# Patient Record
(demographics unavailable — no encounter records)

---

## 2025-02-07 NOTE — CONSULT LETTER
[FreeTextEntry1] : Dear Dr. Michael Lenz,  I had the pleasure of seeing your patient JANNIE PAEZ in the office today.  My office note is attached. PLEASE READ THE "ASSESSMENT" SECTION OF THE NOTE TO SEE MY IMPRESSION AND PLAN.  Thank you very much for allowing me to participate in the care of your patient.  Sincerely,  Moises Craig M.D., FAC, FACP Director, Celiac Program at [facility]  of Medicine, [facility] [employer] at \[facility] [place] [facility]\""/[facility] Adjunct  of Medicine, [facility] Practice Director, [facility] - Gastroenterology at [address] - [address] [place] [address] Tel: [phone] Email: [email or web address]   The attached note has been created using a voice recognition system (Dragon).  There may be some misspellings and typos.  Please call my office if you have any issues or questions.

## 2025-02-07 NOTE — REASON FOR VISIT
[Language Line ] : provided by Language Line   [FreeTextEntry1] : Gastric polyps, reflux, hemorrhoids, rectal bleeding [Interpreters_IDNumber] : [id number] [Interpreters_FullName] : Roberto Carlos Yoo [TWNoteComboBox1] : Citizen of [place]

## 2025-02-07 NOTE — ASSESSMENT
[FreeTextEntry1] : Patient with mild reflux symptoms and no significant findings of reflux related complications on endoscopy.  There was mildly increased intraepithelial lymphocytes seen on biopsies from the second portion of the duodenum, for which we will need to fully exclude celiac disease.  The patient has hemorrhoids which cause occasional bleeding and itching.  A list of dietary and lifestyle modifications in the treatment of GERD was given to the patient.  We discussed this in depth.  The patient was advised that she can use Tums as needed.  Bloodwork will be sent for celiac serologies.  The patient was given Proctozone 2.5% cream to use as needed for the hemorrhoids.  Patient was counseled regarding increasing fiber in her diet and drinking at least 64 ounces of water a day.  The patient was also advised to use Citrucel powder in 8 ounces of water once a day.  We will repeat a colonoscopy in 5 to 10 years.   Plan from 5/22/2024 - Patient with complaints of heartburn and epigastric burning.  She also had rectal bleeding last week.  She has a history of terminal ileal ulcers.  I have changed the patient from sucralfate to pantoprazole 40 mg a day.  An EGD and colonoscopy have been scheduled. The risks, benefits, alternatives, and limitations of the procedures, including the possibility of missed lesions, were explained.   Plan from 10/4/2021 - Patient with terminal ileal ulcers and erythema, possibly due to NSAID use.  There is no evidence of Crohn's disease and the patient has no symptoms.  The patient has a benign liver cyst which is stable in size.  In order to assess for chronicity of the terminal ileal ulcers, we will repeat a colonoscopy in 3 to 5 years.  I advised the patient that there is no need for further follow-up of the liver cyst.  The patient was advised to inform me if she develops abdominal pain, diarrhea, or bleeding.   Plan from 6/16/2021 - Patient with a benign-appearing liver cyst which may have grown in size.  She has reflux but has only mild, infrequent symptoms.  She is due for screening colonoscopy.  Patient was sent for an abdominal ultrasound.  A colonoscopy has been scheduled. The risks, benefits, alternatives, and limitations of the procedure, including the possibility of missed lesions, were explained.   Plan from 5/18/2020 - Patient with reflux who takes omeprazole sparingly for symptoms of heartburn/abdominal pain.  She does have frequent throat clearing however.  The patient has a large liver cyst which appears to be simple and benign.  However, it has grown in size from 5.7 cm to 7.1 cm in a year and a half.  The 2 measurements were done by different modalities and different radiologist.  I advised the patient that throat clearing is a symptom of reflux.  She is otherwise doing well.  We discussed the possibility of taking omeprazole daily to treat the throat clearing.  The patient does not want to do this and would like to continue using the omeprazole as needed.  We discussed dietary and lifestyle modifications in the treatment of reflux.  Blood work will be sent for LFTs, AFP.  Patient will follow-up with me in August.  At that time, we will plan on getting another ultrasound to follow the hepatic cyst.  Patient is due for colonoscopy in March 2021.   Plan from 10/29/2018 - Patient with a simple liver cyst on CT. This is not of great concern. She also has iron deficiency anemia and had a negative GI workup in early 2016. She has been on iron pills. She denies significant gynecologic bleeding.  Patient was reassured regarding the liver cyst.  Blood work was sent for CBC and iron studies.  If the anemia does not respond to oral iron, the patient will require a repeat GI workup.   Plan from 9/28/18 - Patient with iron deficiency anemia. She has had this for some time and had a full GI work up in early 2016. She is shown a good response to iron pills over the past month. She was having heavy periods earlier this year. The patient was found to have a large liver cyst with septation. This will require further evaluation.  Patient was sent for a CT scan to further evaluate the liver lesion.  Additionally, blood work was sent for an Echinococcus panel.  The patient was advised to take iron pills 3 times a day. She will return to see me in one month. If her hemoglobin does not continue to increase, we will then need to repeat the GI workup.

## 2025-02-07 NOTE — HISTORY OF PRESENT ILLNESS
[FreeTextEntry1] : The visit was performed with the assistance of translation services.    We reviewed the patient's EGD and colonoscopy performed on October 14, 2024.  EGD was significant for benign fundic gland polyps in the gastric body.  Biopsies from the second portion of the duodenum showed mildly increased intraepithelial lymphocytes, a nonspecific finding which could be seen in celiac disease.  There were no other significant findings on endoscopic biopsies.  Colonoscopy was normal other than internal and external hemorrhoids.  The patient does see bright red blood on the stool at times, associated with straining with her bowel movements, and has occasional itching.  She denies any rectal pain or burning.  She has not needed any pantoprazole since [place] time but does get occasional heartburn about 1-2 times a week.  She denies dysphagia, nausea, vomiting, abdominal pain.  The patient has 1 solid bowel movement a day without melena but with occasional bright red blood as above.   Note from 5/22/2024 - The patient is seen for the first time since October 4, 2021.  She has a history of terminal ileal ulcers.  She now complains of heartburn and epigastric burning.  She denies abdominal pain, dysphagia, nausea, vomiting.  She has been on sucralfate 1 g once a day for the past week which has helped a little bit.  Last week, she also saw bright red blood on the toilet paper and on the stool on 2 occasions.  She moves her bowels every 1 to 3 days and sometimes strains with the stool.  She denies melena.  She has lost 5 pounds unintentionally.  She takes very occasional Advil.  The patient has not been admitted to the hospital in the past year and denies any cardiac issues.   Note from 10/4/2021 - We reviewed the evaluations done since the patient's last visit on June 16, 2021.  Abdominal ultrasound performed on July 2, 2021 revealed an unchanged left liver cyst.  Colonoscopy performed on August 2, 2021 was significant for the finding of terminal ileal ulcers and erythema with negative biopsies.  The colon was normal.  The patient has internal and external hemorrhoids which are asymptomatic.  The patient takes 2 Advil on occasion about 1-2 times a month.  She denies abdominal pain with diarrhea.  She has about 3 bowel movements a week and denies melena or bright red blood per rectum.  She denies heartburn or dysphagia.  The patient's weight is stable.   Note from 6/16/2021 - The patient has a history of a liver cyst which may have grown in size over time, although these were looked at with 2 different modalities (CT scan first and then ultrasound).  The patient denies abdominal pain.  She has a history of reflux but takes omeprazole sparingly, less than once a month.  She has been watching her diet and has less heartburn, approximately every 1 to 2 weeks.  She denies dysphagia.  She reports 1 normal bowel movement a day without melena or bright red blood per rectum.  She has had mild weight gain.  Her last colonoscopy was several years ago and she is due for a screening colonoscopy.  The patient has not been admitted to the hospital in the past year and denies any cardiac issues.   Note from 5/18/2020 - Patient with a history of reflux who has rare heartburn/abdominal pain which is relieved by omeprazole which she takes less than once a month.  She denies dysphasia or abdominal pain.  She does note frequent throat clearing however.  She moves her bowels once every 2 days without melena or bright red blood per rectum.  The patient's weight is stable.  She has a history of a liver cyst which last measured 5.7 x 3.9 cm on CT scan in 2018.  She had an ultrasound in February which showed that the cyst is now 7.1 cm in the left hepatic lobe.  The patient has not been admitted to the hospital in the past year and denies any cardiac issues.   Note from 10/29/2018 - We reviewed the evaluations done since the patient's last visit on September 28. Blood work was negative for Echinococcus. CT scan revealed a 5.7 x 3.9 cm simple liver cyst as well as a subcentimeter lesion too small to characterize. The patient has been taking iron pills 2-3 times a day. She feels improved energy level. She denies abdominal pain. Bowel movements are normal without melena or prior blood per rectum. She denies heartburn or dysphagia. The patient's weight is stable. She is getting her period monthly but these are not heavy.   Note from 9/28/18 - The patient has a history of iron deficiency anemia for which she underwent GI workup in early 2016. She had a negative EGD and a negative colonoscopy other than internal and external hemorrhoids in March of 2016. The patient had a normal capsule endoscopy on April 12, 2016. She was found to be anemic again on August 22, 2018 with a hemoglobin and hematocrit of 7.6 and 27.6 respectively. She was started on iron pills and repeat blood work on September 19 revealed a hemoglobin and hematocrit of 10.2 and 32.8 respectively. 2 weeks ago, on September 19, the patient was sent to the emergency room because it was felt that she was jaundiced. However blood work from both the emergency room and the outside lab revealed normal LFTs. She subsequently had an abdominal ultrasound on September 25 which showed a large liver cyst measuring 5.2 x 6.6 x 4.6 cm with septation.  The patient is having periods every month that last 4 days and then not too heavy. However the patient was having very heavy periods earlier in the year. She denies abdominal pain. She is on omeprazole for reflux and feels well on the medication but develops heartburn when she misses it. She denies dysphagia. She has one bowel movement a day without melena or prior blood per rectum. The patient's weight is stable. She reports a normal appetite. The patient has not been hospitalized in the past year and denies any cardiac issues.